# Patient Record
Sex: MALE | Race: WHITE | NOT HISPANIC OR LATINO | URBAN - METROPOLITAN AREA
[De-identification: names, ages, dates, MRNs, and addresses within clinical notes are randomized per-mention and may not be internally consistent; named-entity substitution may affect disease eponyms.]

---

## 2022-10-11 ENCOUNTER — WEB ENCOUNTER (OUTPATIENT)
Dept: URBAN - METROPOLITAN AREA CLINIC 94 | Facility: CLINIC | Age: 47
End: 2022-10-11

## 2022-10-12 ENCOUNTER — OFFICE VISIT (OUTPATIENT)
Dept: URBAN - METROPOLITAN AREA CLINIC 94 | Facility: CLINIC | Age: 47
End: 2022-10-12
Payer: COMMERCIAL

## 2022-10-12 VITALS
WEIGHT: 192 LBS | SYSTOLIC BLOOD PRESSURE: 128 MMHG | HEART RATE: 102 BPM | HEIGHT: 66 IN | DIASTOLIC BLOOD PRESSURE: 80 MMHG | BODY MASS INDEX: 30.86 KG/M2 | TEMPERATURE: 98.1 F

## 2022-10-12 DIAGNOSIS — K60.3 ANAL FISTULA: ICD-10-CM

## 2022-10-12 DIAGNOSIS — K62.5 RECTAL BLEEDING: ICD-10-CM

## 2022-10-12 DIAGNOSIS — M86.9 OSTEOMYELITIS OF SYMPHYSIS PUBIS: ICD-10-CM

## 2022-10-12 DIAGNOSIS — K52.9 COLITIS: ICD-10-CM

## 2022-10-12 PROCEDURE — 99204 OFFICE O/P NEW MOD 45 MIN: CPT | Performed by: INTERNAL MEDICINE

## 2022-10-12 RX ORDER — SIMVASTATIN 10 MG
1 TABLET IN THE EVENING TABLET ORAL ONCE A DAY
Status: ACTIVE | COMMUNITY

## 2022-10-12 RX ORDER — FLUTICASONE PROPIONATE 50 UG/1
1 SPRAY IN EACH NOSTRIL SPRAY, METERED NASAL ONCE A DAY
Status: ACTIVE | COMMUNITY

## 2022-10-12 RX ORDER — DICLOFENAC SODIUM TOPICAL GEL, 1% 10 MG/G
AS DIRECTED GEL TOPICAL
Status: ACTIVE | COMMUNITY

## 2022-10-12 RX ORDER — ERGOCALCIFEROL 1.25 MG/1
TAKE ONE CAPSULE BY MOUTH EVERY OTHER WEEK CAPSULE, LIQUID FILLED ORAL
Qty: 2 UNSPECIFIED | Refills: 6 | Status: ACTIVE | COMMUNITY

## 2022-10-12 RX ORDER — METFORMIN HYDROCHLORIDE 500 MG/1
1 TABLET WITH A MEAL TABLET, FILM COATED ORAL ONCE A DAY
Status: ACTIVE | COMMUNITY

## 2022-10-12 RX ORDER — AMLODIPINE BESYLATE 10 MG/1
1 TABLET TABLET ORAL ONCE A DAY
Status: ACTIVE | COMMUNITY

## 2022-10-12 RX ORDER — FUROSEMIDE 40 MG/1
TAKE ONE TABLET BY MOUTH ONE TIME DAILY AS NEEDED FOR SWELLING TABLET ORAL
Qty: 30 UNSPECIFIED | Refills: 2 | Status: ACTIVE | COMMUNITY

## 2022-10-12 RX ORDER — TAMSULOSIN HYDROCHLORIDE 0.4 MG/1
1 CAPSULE CAPSULE ORAL ONCE A DAY
Status: ACTIVE | COMMUNITY

## 2022-10-12 RX ORDER — ALLOPURINOL 100 MG/1
TABLET ORAL
Qty: 30 TABLET | Status: ACTIVE | COMMUNITY

## 2022-10-12 RX ORDER — METRONIDAZOLE 500 MG/1
1 TABLET TABLET ORAL THREE TIMES A DAY
Status: ACTIVE | COMMUNITY

## 2022-10-12 RX ORDER — OXYCODONE HYDROCHLORIDE AND ACETAMINOPHEN 5; 325 MG/1; MG/1
1 TABLET AS NEEDED TABLET ORAL
Status: ACTIVE | COMMUNITY

## 2022-10-12 NOTE — HPI-TODAY'S VISIT:
46 y/o M with hx of DM, pubic symphysis osteomyelitis, nena-rectal abscess with fistula here given enterocolitis, anal fistula to workup for Crohn's  Patient was admitted to [facility] this September for nena-rectal abscess. CT a/p showing TI, cecum/AC inflammation along with multiple nena-rectal abscess, and pubic symphisis osteomyelitis. S/p I&D and on IV abx. Repeat CT a/p with IV contrast did not show any inflammation in bowel. Repeat admission the same month. Most recent MRI pelvis showing the osteomyelitis, and likely anal fistula  Patient does report having nena-rectal abscess a few times in the last 7 years. Has 2 solid BM/day with occasional blood. Reports colonoscopy >15 years ago at [facility] for rectal bleeding. Does not remember the details but thinks had polyps. Denies any other GI symptoms including rectal drainage, family hx of GI malignancy or IBD  MRI pelvis 09/13/22: Pubic symphysis osetomyelitis, likely anal fistula CT a/p w/o contrast 09/01/2022: TI/cecum/AC inflammatory change, multiple nena-rectal abscess, pubic symphisis osteomyelitis

## 2022-11-07 ENCOUNTER — CLAIMS CREATED FROM THE CLAIM WINDOW (OUTPATIENT)
Dept: URBAN - METROPOLITAN AREA SURGERY CENTER 17 | Facility: SURGERY CENTER | Age: 47
End: 2022-11-07

## 2022-11-07 ENCOUNTER — CLAIMS CREATED FROM THE CLAIM WINDOW (OUTPATIENT)
Dept: URBAN - METROPOLITAN AREA SURGERY CENTER 17 | Facility: SURGERY CENTER | Age: 47
End: 2022-11-07
Payer: COMMERCIAL

## 2022-11-07 ENCOUNTER — CLAIMS CREATED FROM THE CLAIM WINDOW (OUTPATIENT)
Dept: URBAN - METROPOLITAN AREA CLINIC 4 | Facility: CLINIC | Age: 47
End: 2022-11-07
Payer: COMMERCIAL

## 2022-11-07 DIAGNOSIS — R93.3 ABN FINDINGS-GI TRACT: ICD-10-CM

## 2022-11-07 DIAGNOSIS — K63.89 BACTERIAL OVERGROWTH SYNDROME: ICD-10-CM

## 2022-11-07 DIAGNOSIS — K62.5 ANAL BLEEDING: ICD-10-CM

## 2022-11-07 DIAGNOSIS — K63.89 OTHER SPECIFIED DISEASES OF INTESTINE: ICD-10-CM

## 2022-11-07 PROCEDURE — 88305 TISSUE EXAM BY PATHOLOGIST: CPT | Performed by: PATHOLOGY

## 2022-11-07 PROCEDURE — 45385 COLONOSCOPY W/LESION REMOVAL: CPT | Performed by: INTERNAL MEDICINE

## 2022-11-07 PROCEDURE — G8907 PT DOC NO EVENTS ON DISCHARG: HCPCS | Performed by: INTERNAL MEDICINE

## 2022-11-07 PROCEDURE — 45380 COLONOSCOPY AND BIOPSY: CPT | Performed by: INTERNAL MEDICINE

## 2022-12-02 ENCOUNTER — OFFICE VISIT (OUTPATIENT)
Dept: URBAN - METROPOLITAN AREA CLINIC 94 | Facility: CLINIC | Age: 47
End: 2022-12-02

## 2022-12-02 ENCOUNTER — DASHBOARD ENCOUNTERS (OUTPATIENT)
Age: 47
End: 2022-12-02

## 2022-12-02 VITALS
BODY MASS INDEX: 30.37 KG/M2 | HEART RATE: 110 BPM | TEMPERATURE: 97.5 F | HEIGHT: 66 IN | WEIGHT: 189 LBS | DIASTOLIC BLOOD PRESSURE: 81 MMHG | SYSTOLIC BLOOD PRESSURE: 138 MMHG

## 2022-12-02 RX ORDER — AMLODIPINE BESYLATE 10 MG/1
1 TABLET TABLET ORAL ONCE A DAY
Status: ACTIVE | COMMUNITY

## 2022-12-02 RX ORDER — FLUTICASONE PROPIONATE 50 UG/1
1 SPRAY IN EACH NOSTRIL SPRAY, METERED NASAL ONCE A DAY
Status: ACTIVE | COMMUNITY

## 2022-12-02 RX ORDER — TAMSULOSIN HYDROCHLORIDE 0.4 MG/1
1 CAPSULE CAPSULE ORAL ONCE A DAY
Status: ACTIVE | COMMUNITY

## 2022-12-02 RX ORDER — METFORMIN HYDROCHLORIDE 500 MG/1
1 TABLET WITH A MEAL TABLET, FILM COATED ORAL ONCE A DAY
Status: ACTIVE | COMMUNITY

## 2022-12-02 RX ORDER — METRONIDAZOLE 500 MG/1
1 TABLET TABLET ORAL THREE TIMES A DAY
Status: ACTIVE | COMMUNITY

## 2022-12-02 RX ORDER — OXYCODONE HYDROCHLORIDE AND ACETAMINOPHEN 5; 325 MG/1; MG/1
1 TABLET AS NEEDED TABLET ORAL
Status: ACTIVE | COMMUNITY

## 2022-12-02 RX ORDER — ERGOCALCIFEROL 1.25 MG/1
TAKE ONE CAPSULE BY MOUTH EVERY OTHER WEEK CAPSULE, LIQUID FILLED ORAL
Qty: 2 UNSPECIFIED | Refills: 6 | Status: ACTIVE | COMMUNITY

## 2022-12-02 RX ORDER — FUROSEMIDE 40 MG/1
TAKE ONE TABLET BY MOUTH ONE TIME DAILY AS NEEDED FOR SWELLING TABLET ORAL
Qty: 30 UNSPECIFIED | Refills: 2 | Status: ACTIVE | COMMUNITY

## 2022-12-02 RX ORDER — DICLOFENAC SODIUM TOPICAL GEL, 1% 10 MG/G
AS DIRECTED GEL TOPICAL
Status: ACTIVE | COMMUNITY

## 2022-12-02 RX ORDER — ALLOPURINOL 100 MG/1
TABLET ORAL
Qty: 30 TABLET | Status: ACTIVE | COMMUNITY

## 2022-12-02 RX ORDER — SIMVASTATIN 10 MG
1 TABLET IN THE EVENING TABLET ORAL ONCE A DAY
Status: ACTIVE | COMMUNITY